# Patient Record
Sex: FEMALE | Race: WHITE | Employment: OTHER | URBAN - METROPOLITAN AREA
[De-identification: names, ages, dates, MRNs, and addresses within clinical notes are randomized per-mention and may not be internally consistent; named-entity substitution may affect disease eponyms.]

---

## 2019-01-01 NOTE — LETTER
WHERE IS YOUR PAIN NOW?  Sharon the areas on your body where you feel the described sensations.  Use the appropriate symbol.  Sharon the areas of radiation.  Include all affected areas.  Just to complete the picture, please draw in the face.     ACHE:  ^ ^ ^   NUMBNESS:  0000   PINS & NEEDLES:  = = = =                              ^ ^ ^                       0000              = = = =                                    ^ ^ ^                       0000            = = = =      BURNING:  XXXX   STABBING: ////                  XXXX                ////                         XXXX          ////     Please sharon the line below indicating your degree of pain right now  with 0 being no pain 10 being the worst pain possible.                                         0             1             2              3             4              5              6              7             8             9             10         Patient Signature:            
No significant past surgical history

## 2021-12-21 ENCOUNTER — OFFICE VISIT (OUTPATIENT)
Dept: OTOLARYNGOLOGY | Facility: CLINIC | Age: 71
End: 2021-12-21
Payer: MEDICARE

## 2021-12-21 VITALS — HEIGHT: 65.5 IN | WEIGHT: 260 LBS | BODY MASS INDEX: 42.8 KG/M2

## 2021-12-21 DIAGNOSIS — H61.23 CERUMEN DEBRIS ON TYMPANIC MEMBRANE OF BOTH EARS: Primary | ICD-10-CM

## 2021-12-21 PROCEDURE — 69210 REMOVE IMPACTED EAR WAX UNI: CPT | Performed by: SPECIALIST

## 2021-12-21 RX ORDER — ASPIRIN 81 MG/1
81 TABLET, CHEWABLE ORAL
COMMUNITY

## 2021-12-21 RX ORDER — LEVOTHYROXINE SODIUM 0.07 MG/1
75 TABLET ORAL DAILY
COMMUNITY
Start: 2021-07-25

## 2021-12-21 RX ORDER — URSODIOL 500 MG/1
TABLET, FILM COATED ORAL
COMMUNITY
Start: 2021-12-14

## 2021-12-21 NOTE — PROGRESS NOTES
Ears = bilateral cerumen occlussions. Fully cleaned under microscope using instrumentation and suctioning. Normal tympanic membranes. Lip in 6 months time, sooner if problems.

## 2023-06-06 ENCOUNTER — OFFICE VISIT (OUTPATIENT)
Dept: OTOLARYNGOLOGY | Facility: CLINIC | Age: 73
End: 2023-06-06

## 2023-06-06 VITALS — BODY MASS INDEX: 43 KG/M2 | HEIGHT: 65.5 IN

## 2023-06-06 DIAGNOSIS — H61.23 CERUMEN DEBRIS ON TYMPANIC MEMBRANE OF BOTH EARS: Primary | ICD-10-CM

## 2023-06-06 PROCEDURE — 69210 REMOVE IMPACTED EAR WAX UNI: CPT | Performed by: SPECIALIST

## 2023-06-06 RX ORDER — NYSTATIN 100000 U/G
CREAM TOPICAL
COMMUNITY
Start: 2022-11-10

## 2023-06-21 ENCOUNTER — ORDER TRANSCRIPTION (OUTPATIENT)
Dept: PHYSICAL THERAPY | Facility: HOSPITAL | Age: 73
End: 2023-06-21

## 2023-06-21 DIAGNOSIS — M25.552 BILATERAL HIP PAIN: Primary | ICD-10-CM

## 2023-06-21 DIAGNOSIS — M25.551 BILATERAL HIP PAIN: Primary | ICD-10-CM

## 2023-06-21 DIAGNOSIS — M50.30 DDD (DEGENERATIVE DISC DISEASE), CERVICAL: ICD-10-CM

## 2023-07-19 ENCOUNTER — OFFICE VISIT (OUTPATIENT)
Dept: PHYSICAL THERAPY | Age: 73
End: 2023-07-19
Attending: FAMILY MEDICINE
Payer: MEDICARE

## 2023-07-19 DIAGNOSIS — M25.551 BILATERAL HIP PAIN: Primary | ICD-10-CM

## 2023-07-19 DIAGNOSIS — M50.30 DDD (DEGENERATIVE DISC DISEASE), CERVICAL: ICD-10-CM

## 2023-07-19 DIAGNOSIS — M25.552 BILATERAL HIP PAIN: Primary | ICD-10-CM

## 2023-07-19 PROCEDURE — 97110 THERAPEUTIC EXERCISES: CPT

## 2023-07-19 PROCEDURE — 97161 PT EVAL LOW COMPLEX 20 MIN: CPT

## 2023-07-19 PROCEDURE — 97140 MANUAL THERAPY 1/> REGIONS: CPT

## 2023-07-21 ENCOUNTER — OFFICE VISIT (OUTPATIENT)
Dept: PHYSICAL THERAPY | Age: 73
End: 2023-07-21
Attending: FAMILY MEDICINE
Payer: MEDICARE

## 2023-07-21 PROCEDURE — 97110 THERAPEUTIC EXERCISES: CPT

## 2023-07-21 PROCEDURE — 97140 MANUAL THERAPY 1/> REGIONS: CPT

## 2023-07-21 NOTE — PROGRESS NOTES
Diagnosis:   Bilateral hip pain (M25.551,M25.552) lumbar DDD    Next MD visit: none scheduled  Fall Risk: standard         Precautions: n/a          Medication Changes since last visit?: No    Subjective: Patient reports her left hip and both knees are really hurting today. She states she noticed more swelling in her left leg overall. Objective:     Date: 7/21/2023  Visit #: 2/10 (medicare)   HEP   -   Therapeutic Exercise   - standing B gastroc stretch on slant board level 3 3 x 30 sec holds  - hooklying hip adduction with ball squeeze 2 x 10  - supine R/L SAQs 2.5# at ankles 1 x 10 ea 3 sec holds  - supine R/L hip IR/ER AROM 1 x 10 ea  - supine gluteal squeezes 1 x 10 5 sec holds  - hooklying R/L hip abd/ER with GTB 2 x 5 ea 5 sec holds   - standing B heel raises 10x  X 27 minutes   Manual Therapy   - foam roll to L TFL, L quad, L gluteal x 8 minutes  - left lateral hip mobilization grade 3 x 8 minutes   Therapeutic Activity   -   Modalities              Assessment: Session focused on manual techniques to assist with reducing hip pain, gentle hip strengthening.      Plan: continue PT    Charges: Ex 2 Man 1      Total Timed Treatment: 43 min  Total Treatment Time: 43 min

## 2023-07-26 ENCOUNTER — OFFICE VISIT (OUTPATIENT)
Dept: PHYSICAL THERAPY | Age: 73
End: 2023-07-26
Attending: FAMILY MEDICINE
Payer: MEDICARE

## 2023-07-26 PROCEDURE — 97110 THERAPEUTIC EXERCISES: CPT

## 2023-07-26 NOTE — PROGRESS NOTES
Diagnosis:   Bilateral hip pain (M25.551,M25.552) lumbar DDD    Next MD visit: none scheduled  Fall Risk: standard         Precautions: n/a          Medication Changes since last visit?: No    Subjective: Patient reports her hips are doing a little better. She reports only mild pain rated as 2-3/10 and continued left leg edema near her knee. Patient reports her left leg feels weak. Objective:     Date: 7/26/2023  Visit #: 3/10 (medicare) Date: 7/21/2023  Visit #: 2/10 (medicare)   HEP    -   Therapeutic Exercise   - NuStep level 4 (UEs & LEs) x 8 minutes  - standing lumbar extension over mat table 10x  - standing R/L hip abduction & hip extension with 1.5# 1 x 10 ea  - standing B gastroc stretch on slant board level 3-4 3 x 30 sec holds  - hooklying hip adduction with ball squeeze 2 x 10  - supine R/L SAQs 3# at ankles 2 x 10 ea 5 sec holds  - supine R/L hip IR/ER AROM 1 x 10 ea  - supine gluteal squeezes 1 x 10 5 sec holds  - hooklying R/L hip abd/ER with GTB 2 x 10 ea 5 sec holds   - seated R/L LAQs 1.5# 10x ea  - standing B heel raises 1.5# 10x  - L hip flexor stretch on stair 3 x 15 sec holds - standing B gastroc stretch on slant board level 3 3 x 30 sec holds  - hooklying hip adduction with ball squeeze 2 x 10  - supine R/L SAQs 2.5# at ankles 1 x 10 ea 3 sec holds  - supine R/L hip IR/ER AROM 1 x 10 ea  - supine gluteal squeezes 1 x 10 5 sec holds  - hooklying R/L hip abd/ER with GTB 2 x 5 ea 5 sec holds   - standing B heel raises 10x  X 27 minutes   Manual Therapy    - foam roll to L TFL, L quad, L gluteal x 8 minutes  - left lateral hip mobilization grade 3 x 8 minutes   Therapeutic Activity    -   Modalities                Assessment:  Session focused on global hip strengthening interventions as tolerated.      Plan: continue PT    Charges: Ex 3  Total Timed Treatment: 43 min  Total Treatment Time: 43 min

## 2023-07-28 ENCOUNTER — OFFICE VISIT (OUTPATIENT)
Dept: PHYSICAL THERAPY | Age: 73
End: 2023-07-28
Attending: FAMILY MEDICINE
Payer: MEDICARE

## 2023-07-28 PROCEDURE — 97110 THERAPEUTIC EXERCISES: CPT

## 2023-07-28 NOTE — PROGRESS NOTES
Diagnosis:   Bilateral hip pain (M25.551,M25.552) lumbar DDD    Next MD visit: none scheduled  Fall Risk: standard         Precautions: n/a          Medication Changes since last visit?: No    Subjective: Patient reports her hips are doing better. She states she has less difficulty getting out of bed and moving her legs in the morning.     Objective:     Date: 7/28/2023  Visit #: 4/10 (medicare) Date: 7/26/2023  Visit #: 3/10 (medicare) Date: 7/21/2023  Visit #: 2/10 (medicare)   HEP     -   Therapeutic Exercise   - NuStep level 4 (UEs & LEs) x 10 minutes  - standing R/L hip abduction & hip extension with 1.5# 2 x 10 ea  - standing B gastroc stretch on slant board level 3-4 3 x 30 sec holds  - hooklying hip adduction with ball squeeze 2 x 10  - supine R/L SAQs 4# at ankles 2 x 10 ea 5 sec holds  - prone R/L hip extension 10x ea  - prone B knee flexion with ball squeeze 2 x 10  - hooklying R/L hip abd/ER with GTB 2 x 10 ea 5 sec holds   - seated R/L LAQs 1.5# 2 x 10 ea  - standing B heel raises 1.5# 2 x 10   - NuStep level 4 (UEs & LEs) x 8 minutes  - standing lumbar extension over mat table 10x  - standing R/L hip abduction & hip extension with 1.5# 1 x 10 ea  - standing B gastroc stretch on slant board level 3-4 3 x 30 sec holds  - hooklying hip adduction with ball squeeze 2 x 10  - supine R/L SAQs 3# at ankles 2 x 10 ea 5 sec holds  - supine R/L hip IR/ER AROM 1 x 10 ea  - supine gluteal squeezes 1 x 10 5 sec holds  - hooklying R/L hip abd/ER with GTB 2 x 10 ea 5 sec holds   - seated R/L LAQs 1.5# 10x ea  - standing B heel raises 1.5# 10x  - L hip flexor stretch on stair 3 x 15 sec holds - standing B gastroc stretch on slant board level 3 3 x 30 sec holds  - hooklying hip adduction with ball squeeze 2 x 10  - supine R/L SAQs 2.5# at ankles 1 x 10 ea 3 sec holds  - supine R/L hip IR/ER AROM 1 x 10 ea  - supine gluteal squeezes 1 x 10 5 sec holds  - hooklying R/L hip abd/ER with GTB 2 x 5 ea 5 sec holds   - standing B heel raises 10x  X 27 minutes   Manual Therapy     - foam roll to L TFL, L quad, L gluteal x 8 minutes  - left lateral hip mobilization grade 3 x 8 minutes   Therapeutic Activity     -   Modalities                  Assessment:  Initiated prone hip strengthening interventions.     Plan: continue PT    Charges: Ex 3  Total Timed Treatment: 47 min  Total Treatment Time: 47 min

## 2023-08-02 ENCOUNTER — OFFICE VISIT (OUTPATIENT)
Dept: PHYSICAL THERAPY | Age: 73
End: 2023-08-02
Attending: FAMILY MEDICINE
Payer: MEDICARE

## 2023-08-02 PROCEDURE — 97110 THERAPEUTIC EXERCISES: CPT

## 2023-08-02 NOTE — PROGRESS NOTES
Diagnosis:   Bilateral hip pain (M25.551,M25.552) lumbar DDD    Next MD visit: none scheduled  Fall Risk: standard         Precautions: n/a          Medication Changes since last visit?: No    Subjective: Patient reports she is having more pain today. She reports she did do a lot of sitting yesterday. She reports she feels a lot better in the mornings upon waking. Pt reports continued L PSIS region pain, L groin pain, L thigh pain. She reports intermittent numbness/tingling/altered sensation in her BLEs. Pt reports increased pain symptoms shooting below her knee with bending activities.      Objective:     Date: 8/2/2023  Visit #: 5/10 (medicare) Date: 7/28/2023  Visit #: 4/10 (medicare) Date: 7/26/2023  Visit #: 3/10 (medicare)   HEP        Therapeutic Exercise   - standing R/L hip abduction & hip extension with 2# 1 x 10 ea  - standing B gastroc stretch on slant board level 3-4 3 x 30 sec holds  - hooklying hip adduction with ball squeeze 3 x 10  - prone B knee flexion with ball squeeze 2 x 10  - hooklying B hip abd/ER with fitness [place] 2 x 10   - seated R/L LAQs 3# 2 x 10 ea  - standing B heel raises 1.5# 2 x 10  - SHEA 2 minutes  - prone B knee bends with ball 10x  - standing lumbar extension 2 x 10 (no effect?)  - PPU 2 x 5 reps - NuStep level 4 (UEs & LEs) x 10 minutes  - standing R/L hip abduction & hip extension with 1.5# 2 x 10 ea  - standing B gastroc stretch on slant board level 3-4 3 x 30 sec holds  - hooklying hip adduction with ball squeeze 2 x 10  - supine R/L SAQs 4# at ankles 2 x 10 ea 5 sec holds  - prone R/L hip extension 10x ea  - prone B knee flexion with ball squeeze 2 x 10  - hooklying R/L hip abd/ER with GTB 2 x 10 ea 5 sec holds   - seated R/L LAQs 1.5# 2 x 10 ea  - standing B heel raises 1.5# 2 x 10   - NuStep level 4 (UEs & LEs) x 8 minutes  - standing lumbar extension over mat table 10x  - standing R/L hip abduction & hip extension with 1.5# 1 x 10 ea  - standing B gastroc stretch on slant board level 3-4 3 x 30 sec holds  - hooklying hip adduction with ball squeeze 2 x 10  - supine R/L SAQs 3# at ankles 2 x 10 ea 5 sec holds  - supine R/L hip IR/ER AROM 1 x 10 ea  - supine gluteal squeezes 1 x 10 5 sec holds  - hooklying R/L hip abd/ER with GTB 2 x 10 ea 5 sec holds   - seated R/L LAQs 1.5# 10x ea  - standing B heel raises 1.5# 10x  - L hip flexor stretch on stair 3 x 15 sec holds   Manual Therapy        Therapeutic Activity   - education on anatomy of condition x 5 minutes  - discussed sitting in upright posture - avoidance of slouching     Modalities                  Assessment:  Trial of lumbar extension based exercises and pt displaying slight improvement in functional mobility post interventions including transfers and walking with possible centralization of symptoms, but unclear at this time.      Plan: continue PT    Charges: Ex 3  Total Timed Treatment: 44 min  Total Treatment Time: 44 min

## 2023-08-09 ENCOUNTER — OFFICE VISIT (OUTPATIENT)
Dept: PHYSICAL THERAPY | Age: 73
End: 2023-08-09
Attending: FAMILY MEDICINE
Payer: MEDICARE

## 2023-08-09 PROCEDURE — 97110 THERAPEUTIC EXERCISES: CPT

## 2023-08-09 PROCEDURE — 97140 MANUAL THERAPY 1/> REGIONS: CPT

## 2023-08-09 NOTE — PROGRESS NOTES
Diagnosis:   Bilateral hip pain (M25.551,M25.552) lumbar DDD    Next MD visit: none scheduled  Fall Risk: standard         Precautions: n/a          Medication Changes since last visit?: No    Subjective: Patient reports 5-6/10 hip and states she had more pain yesterday and had to take aleve and as a result sat a lot. She reports her left leg is still swollen. Pt reports RLE pain going down her entire leg at times and continued L groin/thigh region pain.      Objective:    8/9/2023:  Continued observable L thigh edema noted     Date: 8/9/2023  Visit #: 6/10 (medicare) Date: 8/2/2023  Visit #: 5/10 (medicare) Date: 7/28/2023  Visit #: 4/10 (medicare)   HEP        Therapeutic Exercise   - supine R/L SAQs with 3# 2 x 10 ea 5 sec holds  - prone R/L hip extension 2 x 10 ea  - prone B knee bends with ball with squeeze 2 x 10  - standing lumbar extension 5x  - seated lumbar extension 10x  - standing B gastroc stretch on slant board level 4 3 x 30 sec holds  X 36 minutes - standing R/L hip abduction & hip extension with 2# 1 x 10 ea  - standing B gastroc stretch on slant board level 3-4 3 x 30 sec holds  - hooklying hip adduction with ball squeeze 3 x 10  - prone B knee flexion with ball squeeze 2 x 10  - hooklying B hip abd/ER with fitness [place] 2 x 10   - seated R/L LAQs 3# 2 x 10 ea  - standing B heel raises 1.5# 2 x 10  - SHEA 2 minutes  - prone B knee bends with ball 10x  - standing lumbar extension 2 x 10 (no effect?)  - PPU 2 x 5 reps - NuStep level 4 (UEs & LEs) x 10 minutes  - standing R/L hip abduction & hip extension with 1.5# 2 x 10 ea  - standing B gastroc stretch on slant board level 3-4 3 x 30 sec holds  - hooklying hip adduction with ball squeeze 2 x 10  - supine R/L SAQs 4# at ankles 2 x 10 ea 5 sec holds  - prone R/L hip extension 10x ea  - prone B knee flexion with ball squeeze 2 x 10  - hooklying R/L hip abd/ER with GTB 2 x 10 ea 5 sec holds   - seated R/L LAQs 1.5# 2 x 10 ea  - standing B heel raises 1.5# 2 x 10     Manual Therapy   - prone lumbar spine mobilizations grade 2-3 x 6 minutes  - foam roll to R/L piriformis, gluteal max, TFL x 6 minutes     Therapeutic Activity    - education on anatomy of condition x 5 minutes  - discussed sitting in upright posture - avoidance of slouching    Modalities                  Assessment:  Discussed with patient that since she has completed six PT sessions and is still continuing to have severe left hip pain limiting her walking it would be recommended for her to follow up with her MD in the near future for further recommendations. Session focused on continuation of hip and spine interventions.      Plan: continue PT -2-3 more sessions - pt to contact MD as well    Charges: Ex 2 Man 1  Total Timed Treatment: 48 min  Total Treatment Time: 52 min

## 2023-08-16 ENCOUNTER — OFFICE VISIT (OUTPATIENT)
Dept: PHYSICAL THERAPY | Age: 73
End: 2023-08-16
Attending: INTERNAL MEDICINE
Payer: MEDICARE

## 2023-08-16 PROCEDURE — 97110 THERAPEUTIC EXERCISES: CPT

## 2023-08-16 PROCEDURE — 97140 MANUAL THERAPY 1/> REGIONS: CPT

## 2023-08-16 NOTE — PROGRESS NOTES
Diagnosis:   Bilateral hip pain (M25.551,M25.552) lumbar DDD    Next MD visit: none scheduled  Fall Risk: standard         Precautions: n/a          Medication Changes since last visit?: No    Subjective: Patient reports 1/10 left hip/groin pain today. She states she still has bad days with her hips and back, but states today it is better. She reports when she sits she is okay, but when she ambulates and bears weight on her legs they will start hurting.       Objective:    8/9/2023:  Continued observable L thigh edema noted     Date: 8/16/2023  Visit #: 7/10 (medicare) Date: 8/9/2023  Visit #: 6/10 (medicare) Date: 8/2/2023  Visit #: 5/10 (medicare)   HEP        Therapeutic Exercise   - bridges with ball 2 x 10  - supine R/L SAQs with 4# 2 x 10 ea 5 sec holds  - hooklying R/L hip abd/ER with GTB 2 x 10 ea   - prone R/L hip extension 2 x 10 ea  - shuttle machine B knee ext/flx 4 bands 3 x 10  - standing B heel raises 2 x 10  - standing B gastroc stretch on slant board level 4 3 x 30 sec holds  - standing R/L hip abduction/hip extension with 2# 2 x 10 ea  - standing R/L hip flexor stretch on stair 3 x 15 sec holds  X 38 minutes     - supine R/L SAQs with 3# 2 x 10 ea 5 sec holds  - prone R/L hip extension 2 x 10 ea  - prone B knee bends with ball with squeeze 2 x 10  - standing lumbar extension 5x  - seated lumbar extension 10x  - standing B gastroc stretch on slant board level 4 3 x 30 sec holds  X 36 minutes - standing R/L hip abduction & hip extension with 2# 1 x 10 ea  - standing B gastroc stretch on slant board level 3-4 3 x 30 sec holds  - hooklying hip adduction with ball squeeze 3 x 10  - prone B knee flexion with ball squeeze 2 x 10  - hooklying B hip abd/ER with fitness [place] 2 x 10   - seated R/L LAQs 3# 2 x 10 ea  - standing B heel raises 1.5# 2 x 10  - SHEA 2 minutes  - prone B knee bends with ball 10x  - standing lumbar extension 2 x 10 (no effect?)  - PPU 2 x 5 reps   Manual Therapy   - foam roll to R/L piriformis, gluteal max, TFL x 12 minutes - prone lumbar spine mobilizations grade 2-3 x 6 minutes  - foam roll to R/L piriformis, gluteal max, TFL x 6 minutes    Therapeutic Activity     - education on anatomy of condition x 5 minutes  - discussed sitting in upright posture - avoidance of slouching   Modalities                  Assessment:  Continued with manual techniques to reduce gluteal/TFL/ITB tension and strengthening of hip musculature as tolerated. Initiated hip flexor stretching on stair as well.      Plan: continue PT 1-2 more sessions - pt to contact MD as well    Charges: Ex 2 Man 1  Total Timed Treatment: 50 min  Total Treatment Time: 50 min

## 2023-08-24 ENCOUNTER — OFFICE VISIT (OUTPATIENT)
Dept: PHYSICAL THERAPY | Age: 73
End: 2023-08-24
Attending: INTERNAL MEDICINE
Payer: MEDICARE

## 2023-08-24 PROCEDURE — 97110 THERAPEUTIC EXERCISES: CPT

## 2023-08-24 PROCEDURE — 97140 MANUAL THERAPY 1/> REGIONS: CPT

## 2023-08-24 NOTE — PROGRESS NOTES
Diagnosis:   Bilateral hip pain (M25.551,M25.552) lumbar DDD    Next MD visit: none scheduled  Fall Risk: standard         Precautions: n/a          Medication Changes since last visit?: No    Subjective: Patient reports she is having a lot of difficulty getting her shoes and socks on. Pt reports in the mornings she feels okay, but once she starts walking and bearing weight her left leg really hurts. She reports she was crying yesterday due to the pain. She reports she has a doctor visit coming up next week.      Objective:    8/9/2023:  Continued observable L thigh edema noted     Date: 8/24/2023  Visit #: 8/10 (medicare) Date: 8/16/2023  Visit #: 7/10 (medicare) Date: 8/9/2023  Visit #: 6/10 (medicare)   HEP   - updated HEP - see pt instructions section     Therapeutic Exercise   - bridges with SB 2 x 10  - supine R/L SAQs with 4# 3 x 10 ea 5 sec holds  - sidelying R/L clams with 4# 10x ea  - shuttle machine B knee ext/flx 4 bands 3 x 10  - standing B heel raises 2 x 10  - standing B gastroc stretch on slant board level 4 3 x 30 sec holds  - standing R/L hip abduction/hip extension with RTB at ankles 2 x 10 ea  X 35 minutes - bridges with ball 2 x 10  - supine R/L SAQs with 4# 2 x 10 ea 5 sec holds  - hooklying R/L hip abd/ER with GTB 2 x 10 ea   - prone R/L hip extension 2 x 10 ea  - shuttle machine B knee ext/flx 4 bands 3 x 10  - standing B heel raises 2 x 10  - standing B gastroc stretch on slant board level 4 3 x 30 sec holds  - standing R/L hip abduction/hip extension with 2# 2 x 10 ea  - standing R/L hip flexor stretch on stair 3 x 15 sec holds  X 38 minutes     - supine R/L SAQs with 3# 2 x 10 ea 5 sec holds  - prone R/L hip extension 2 x 10 ea  - prone B knee bends with ball with squeeze 2 x 10  - standing lumbar extension 5x  - seated lumbar extension 10x  - standing B gastroc stretch on slant board level 4 3 x 30 sec holds  X 36 minutes   Manual Therapy   - foam roll to R/L piriformis, gluteal max, TFL x 12 minutes - foam roll to R/L piriformis, gluteal max, TFL x 12 minutes - prone lumbar spine mobilizations grade 2-3 x 6 minutes  - foam roll to R/L piriformis, gluteal max, TFL x 6 minutes   Therapeutic Activity        Modalities                  Assessment:  Progressed hip strengthening interventions as tolerated. Advanced reps of current therapeutic exercises.        Plan: continue PT 1 more session - pt to follow up with MD    Charges: Ex 2 Man 1  Total Timed Treatment: 47 min  Total Treatment Time: 47 min

## 2023-08-31 ENCOUNTER — OFFICE VISIT (OUTPATIENT)
Dept: PHYSICAL THERAPY | Age: 73
End: 2023-08-31
Attending: INTERNAL MEDICINE
Payer: MEDICARE

## 2023-08-31 PROCEDURE — 97110 THERAPEUTIC EXERCISES: CPT

## 2023-10-23 ENCOUNTER — TELEPHONE (OUTPATIENT)
Dept: PHYSICAL MEDICINE AND REHAB | Facility: CLINIC | Age: 73
End: 2023-10-23

## 2023-10-23 ENCOUNTER — OFFICE VISIT (OUTPATIENT)
Dept: PHYSICAL MEDICINE AND REHAB | Facility: CLINIC | Age: 73
End: 2023-10-23
Payer: MEDICARE

## 2023-10-23 VITALS — OXYGEN SATURATION: 99 % | HEART RATE: 76 BPM | BODY MASS INDEX: 36 KG/M2 | WEIGHT: 220 LBS

## 2023-10-23 DIAGNOSIS — M16.12 PRIMARY OSTEOARTHRITIS OF LEFT HIP: Primary | ICD-10-CM

## 2023-10-23 PROBLEM — E03.9 HYPOTHYROIDISM: Status: ACTIVE | Noted: 2023-10-23

## 2023-10-23 PROBLEM — E78.5 HYPERLIPIDEMIA: Status: ACTIVE | Noted: 2023-10-23

## 2023-10-23 PROBLEM — K74.3 PRIMARY BILIARY CHOLANGITIS (HCC): Status: ACTIVE | Noted: 2021-02-19

## 2023-10-23 PROCEDURE — 99204 OFFICE O/P NEW MOD 45 MIN: CPT | Performed by: PHYSICAL MEDICINE & REHABILITATION

## 2023-10-23 RX ORDER — PRAVASTATIN SODIUM 20 MG
20 TABLET ORAL DAILY
COMMUNITY
Start: 2023-09-05

## 2023-10-23 NOTE — TELEPHONE ENCOUNTER
XR L hip and lumbar spine DOS 06/14/23 scanned into PACS and copy of report placed on Dr. Martina Brown desk. Disc and report mailed to patient.

## 2023-10-23 NOTE — PATIENT INSTRUCTIONS
Please consider aquatic exercises for hip arthritis. Very beneficial.  [address] has pool exercise classes for arthritis at [facility]. Please bring the CD with hip x-ray images from [facility] for me to review.

## 2023-10-23 NOTE — TELEPHONE ENCOUNTER
Per CMS Guidelines -no authorization is required for Left hip injection CPT 15978, 01184     Status: Authorization is not required based on medical necessity however may be subject to review once claim is submitted-Covered Benefit

## 2023-10-26 NOTE — TELEPHONE ENCOUNTER
Patient has been scheduled for  Left hip injection   on 11/9/2023 at the [facility] with .   -Anesthesia type: local.  -If scheduling [address] covid testing required for all procedures whether patient is vaccinated or not. -Patient informed not to eat or drink anything after midnight the night prior to the procedure, if being sedated. (For afternoon injections: Patient to fast 6-8 hours prior to procedure with IVCS/MAC. )  -Patient was advised that if he/she does receive the covid vaccine it needs to be at least 2 weeks before or after the injection. -Medications and allergies reviewed. -Patient reminded to hold NSAIDs (Ibuprofen, ASA 81, Aleve, Naproxen, Mobic, Diclofenac, Etodolac, Celebrex etc.) for 3 days prior to [facility]  if BMI is greater than 35. For Cervical injections only hold multivitamins, Vitamin E, Fish Oil, Phentermine (Lomaira) for 7 days prior to injection and NSAIDS.  mg to be held for 7 days prior to injections.  -If patient is receiving MAC/IVCS Phentermine Gertrude Fester), Adlyxin (Lixisenatide), Bydureon BCise (Exenatide-release), Byetta (Exenatide), Mounjaro (Tirezepatide), Ozempic (Semaglutide), Rybelsus (oral demaglutide), Saxenda (Liraglutide), Trulicity (Dulaglutide), Victoriza (Liraglutide), Wegovy (Semaglutide), Berberine (oral natures ozempic) will need to be held for 7 days prior to injection.  -If patient's BMI is greater than 50. Patient is unable to get IVCS/MAC at [facility]. (Options: Patient can go to [facility] under MAC or ENDO under IVCS-reminder physician's slots at ENDO are limited. OR Patient can have the procedure done under local anesthesia at [facility] with Valium ( per physician's preference.)    -If on blood thinner clearance has been received to hold this medication by provider.   -Patient informed he/she will need a  to and from procedure. -[facility] is located in the [facility] 1st floor.  Patient may park in the yellow or purple parking.     Patient verbalized understanding and agrees with plan.  -----> Scheduled in Epic: Yes  -----> Scheduled in [employer]/Surgical Case Request: Yes

## 2023-11-09 PROBLEM — M16.12 PRIMARY OSTEOARTHRITIS OF LEFT HIP: Status: ACTIVE | Noted: 2023-11-09

## 2023-12-11 ENCOUNTER — OFFICE VISIT (OUTPATIENT)
Dept: PHYSICAL MEDICINE AND REHAB | Facility: CLINIC | Age: 73
End: 2023-12-11
Payer: MEDICARE

## 2023-12-11 VITALS — HEIGHT: 66 IN | WEIGHT: 208 LBS | BODY MASS INDEX: 33.43 KG/M2

## 2023-12-11 DIAGNOSIS — M16.12 PRIMARY OSTEOARTHRITIS OF LEFT HIP: Primary | ICD-10-CM

## 2023-12-11 PROCEDURE — 99214 OFFICE O/P EST MOD 30 MIN: CPT | Performed by: PHYSICAL MEDICINE & REHABILITATION

## 2023-12-11 RX ORDER — MELOXICAM 15 MG/1
15 TABLET ORAL DAILY
Qty: 30 TABLET | Refills: 0 | Status: SHIPPED | OUTPATIENT
Start: 2023-12-11

## 2023-12-12 ENCOUNTER — TELEPHONE (OUTPATIENT)
Dept: PHYSICAL MEDICINE AND REHAB | Facility: CLINIC | Age: 73
End: 2023-12-12

## 2023-12-12 DIAGNOSIS — M16.12 PRIMARY OSTEOARTHRITIS OF LEFT HIP: Primary | ICD-10-CM

## 2023-12-13 NOTE — TELEPHONE ENCOUNTER
Patient has been scheduled for Left hip injection on 12/21/2023 at the [facility] with .     -Anesthesia type: LOCAL. -If scheduling [address] [facility] covid testing required for all procedures whether patient is vaccinated or not. -Patient informed not to eat or drink anything after midnight the night prior to the procedure, if being sedated. (For afternoon injections: Patient to fast 6-8 hours prior to procedure with IVCS/MAC. )  -Patient was advised that if he/she does receive the covid vaccine it needs to be at least 2 weeks before or after the injection. -Medications and allergies reviewed. -Patient reminded to hold NSAIDs (Ibuprofen, ASA 81, Aleve, Naproxen, Mobic, Diclofenac, Etodolac, Celebrex etc.) for 3 days prior to [place]  if BMI is greater than 35. For Cervical injections only hold multivitamins, Vitamin E, Fish Oil, Phentermine (Lomaira) for 7 days prior to injection and NSAIDS.  mg to be held for 7 days prior to injections.  -If patient is receiving MAC/IVCS Phentermine Gertrude Fester), Adlyxin (Lixisenatide), Bydureon BCise (Exenatide-release), Byetta (Exenatide), Mounjaro (Tirezepatide), Ozempic (Semaglutide), Rybelsus (oral demaglutide), Saxenda (Liraglutide), Trulicity (Dulaglutide), Victoriza (Liraglutide), Wegovy (Semaglutide), Berberine (oral natures ozempic) will need to be held for 7 days prior to injection.  -If patient's BMI is greater than 50. Patient is unable to get IVCS/MAC at [facility]. (Options: Patient can go to [address] [place] [address] under MAC or ENDO under IVCS-reminder physician's slots at ENDO are limited. OR Patient can have the procedure done under local anesthesia at [facility] with Valium ( per physician's preference.)    -If on blood thinner clearance has been received to hold this medication by provider.   -Patient informed he/she will need a  to and from procedure.  EFFECTIVE 12/1/23- Per [facility]: \" Our PAT team will notify the patient that their ride MUST remain onsite for the entirety of their visit if the ride is unable to do so the procedure will be canceled. \"    -[address] [place] [address] is located in the [facility] 1st floor. Patient may park in the yellow or purple parking.     Follow up appointment has been scheduled for patient on:     Patient verbalized understanding and agrees with plan.  -----> Scheduled in Epic: Yes  -----> Scheduled in [employer]/Surgical Case Request: Yes

## 2024-01-29 ENCOUNTER — TELEPHONE (OUTPATIENT)
Dept: NEUROLOGY | Facility: CLINIC | Age: 74
End: 2024-01-29

## 2024-01-29 RX ORDER — MELOXICAM 15 MG/1
15 TABLET ORAL DAILY
Qty: 30 TABLET | Refills: 0 | Status: SHIPPED | OUTPATIENT
Start: 2024-01-29

## 2024-01-29 NOTE — TELEPHONE ENCOUNTER
Refill Request    Medication request: Meloxicam 15 MG Oral Tab. Take 1 tablet (15 mg total) by mouth daily.     LOV: 12/11/2023 with Akshat Felton M.D.  Due back to clinic per last office note: Per Dr. Felton: \"I recommend a repeat hip injection, Mobic and to see Dr. Page.\"  NOV: Visit date not found f/u post-ortho (?); no specific f/u date mentioned in LOV note.     [place]: 12[place]11/2023 #30    Urine drug screen (if applicable): n/a  Pain contract: n/a    LOV plan (if weaning or changing medications): see above      This is refill 2 out of 3. Next refill, if requested would be the last authorized before a follow up appointment is required.

## 2024-02-28 RX ORDER — MELOXICAM 15 MG/1
15 TABLET ORAL DAILY
Qty: 30 TABLET | Refills: 0 | Status: SHIPPED | OUTPATIENT
Start: 2024-02-28

## 2024-02-28 NOTE — TELEPHONE ENCOUNTER
Refill Request    Medication request: Meloxicam 15 MG Oral Tab.  Take 1 tablet (15 mg total) by mouth daily.      LOV:Visit date not found   Due back to clinic per last office note:  I recommend a repeat hip injection, Mobic and to see Dr. Page.   NOV: Visit date not found      [place]: 1/29/2024 #30    Urine drug screen (if applicable): N/A  Pain contract: N/A    LOV plan (if weaning or changing medications): I recommend a repeat hip injection, Mobic and to see Dr. Page.     Per refill request on 1/29/2024 This is refill 2 out of 3. Next refill, if requested would be the last authorized before a follow up appointment is required.       This is last refill authorized.

## 2024-03-19 ENCOUNTER — OFFICE VISIT (OUTPATIENT)
Dept: OTOLARYNGOLOGY | Facility: CLINIC | Age: 74
End: 2024-03-19

## 2024-03-19 VITALS — HEIGHT: 66 IN | WEIGHT: 208 LBS | BODY MASS INDEX: 33.43 KG/M2

## 2024-03-19 DIAGNOSIS — H93.13 BILATERAL TINNITUS: ICD-10-CM

## 2024-03-19 DIAGNOSIS — H61.23 CERUMEN DEBRIS ON TYMPANIC MEMBRANE OF BOTH EARS: Primary | ICD-10-CM

## 2024-03-19 PROCEDURE — 99213 OFFICE O/P EST LOW 20 MIN: CPT | Performed by: SPECIALIST

## 2024-03-19 NOTE — PROGRESS NOTES
Brianna Bragg is a 73 year old female.   Chief Complaint   Patient presents with    Ear Wax     Ear cleaning     HPI:   Here to get her ears cleaned and reports intermittent tinnitus.    Current Outpatient Medications   Medication Sig Dispense Refill    MELOXICAM 15 MG Oral Tab Take 1 tablet by mouth once daily 30 tablet 0    Calcium Carbonate Antacid 600 MG Oral Chew Tab Chew 600 mg by mouth.      Cholecalciferol 125 MCG (5000 UT) Oral Tab Take 1 tablet (5,000 Units total) by mouth daily.      pravastatin 20 MG Oral Tab Take 1 tablet (20 mg total) by mouth daily.      nystatin 100,000 Units/g External Cream Apply bid x 2 weeks      levothyroxine 75 MCG Oral Tab Take 1 tablet (75 mcg total) by mouth daily.      Ursodiol 500 MG Oral Tab TAKE 2 TABLETS BY MOUTH ONCE DAILY IN THE MORNING AND 1 IN THE EVENING      aspirin 81 MG Oral Chew Tab Chew 1 tablet (81 mg total) by mouth.        Past Medical History:   Diagnosis Date    Disorder of thyroid     High cholesterol     Hyperlipidemia     Hyperthyroidism       Social History:  Social History     Socioeconomic History    Marital status:    Tobacco Use    Smoking status: Never    Smokeless tobacco: Never   Vaping Use    Vaping Use: Never used   Substance and Sexual Activity    Alcohol use: Never    Drug use: Never        REVIEW OF SYSTEMS:   GENERAL HEALTH: feels well otherwise  GENERAL : denies fever, chills, sweats, weight loss, weight gain  SKIN: denies any unusual skin lesions or rashes  RESPIRATORY: denies shortness of breath with exertion  NEURO: denies headaches    EXAM:   Ht 5' 6\" (1.676 m)   Wt 208 lb (94.3 kg)   BMI 33.57 kg/m²   System Details   Skin Inspection - Normal.   Constitutional Overall appearance - Normal.   Head/Face Facial features - Normal. Eyebrows - Normal. Skull - Normal.   Eyes Conjunctiva - Right: Normal, Left: Normal. Pupil - Right: Normal, Left: Normal.    Ears Inspection - Right: Normal, Left: Normal.   Ears = bilateral cerumen  occlussions.    Fully cleaned under microscope using instrumentation and suctioning.    Normal tympanic membranes.  Tuning fork 512 Hz right equals left   Nasal External nose - Normal.   Nasal septum - Normal.  Turbinates - Normal.   Oral/Oropharynx Lips - Normal, Tonsils - Normal, Tongue - Normal    Neck Exam Inspection - Normal. Palpation - Normal. Parotid gland - Normal. Thyroid gland - Normal.  No carotid bruits by auscultation   Lymph Detail Submental. Submandibular. Anterior cervical. Posterior cervical. Supraclavicular all without enlargement   Psychiatric Orientation - Oriented to time, place, person & situation. Appropriate mood and affect.   Neurological Memory - Normal. Cranial nerves - Cranial nerves II through XII grossly intact.     ASSESSMENT AND PLAN:   1. Cerumen debris on tympanic membrane of both ears  Fully cleaned as above    2. Bilateral tinnitus  Hearing grossly symmetric by tuning fork evaluation.  No carotid bruits.  Can reduce sodium and caffeine.  Can try Lipoflavonoid.  If tinnitus is persistent would recommend an audiogram.  Follow-up in 6 months time, sooner if problems.      The patient indicates understanding of these issues and agrees to the plan.      Sylvia Escobar MD  3/19/2024  12:26 PM

## 2024-03-19 NOTE — PATIENT INSTRUCTIONS
Cerumen was fully cleaned from your ears.  You can reduce your sodium and caffeine for your tinnitus.  You can also try Lipoflavonoid.  If the tinnitus is persistent, an audiogram would be recommended.  Follow-up in 6 months time, sooner if problems.

## 2024-04-18 RX ORDER — MELOXICAM 15 MG/1
15 TABLET ORAL DAILY
Qty: 30 TABLET | Refills: 0 | OUTPATIENT
Start: 2024-04-18

## 2024-04-18 NOTE — TELEPHONE ENCOUNTER
Refill Request    Medication request: MELOXICAM 15 MG Oral Tab. Take 1 tablet by mouth once daily      LOV:12/11/2023 with Dr Felton  Due back to clinic per last office note:  I recommend a repeat hip injection, Mobic and to see Dr. Page. Injection done on 12/21/2023.  NOV: Visit date not found      [place]: 2/29/2024 #30    Urine drug screen (if applicable): N/A  Pain contract: N/A    LOV plan (if weaning or changing medications): on 2/28/2024 Last refill authorized.     Refill denied, until patient is seen in the follow up appointment

## 2024-05-01 RX ORDER — MELOXICAM 15 MG/1
15 TABLET ORAL DAILY
Qty: 30 TABLET | Refills: 0 | OUTPATIENT
Start: 2024-05-01

## 2024-05-01 NOTE — TELEPHONE ENCOUNTER
Refill Request    Medication request: MELOXICAM 15 MG Oral Tab.  Take 1 tablet by mouth once daily      LOV:12/11/2023 with Dr Felton  Due back to clinic per last office note: post injection follow up per protocol.    NOV: Visit date not found      [place]: 2/29/2024 #30 (30 days)    Urine drug screen (if applicable): N/A  Pain contract: N/A    LOV plan (if weaning or changing medications): I recommend a repeat hip injection, Mobic and to see Dr. Page.       Refill denied. Please see request on 4/18/2024 as well. Patient needs to be seen in office prior.

## 2024-05-13 ENCOUNTER — MED REC SCAN ONLY (OUTPATIENT)
Dept: PHYSICAL MEDICINE AND REHAB | Facility: CLINIC | Age: 74
End: 2024-05-13

## 2024-05-13 ENCOUNTER — OFFICE VISIT (OUTPATIENT)
Dept: PHYSICAL MEDICINE AND REHAB | Facility: CLINIC | Age: 74
End: 2024-05-13

## 2024-05-13 DIAGNOSIS — M16.12 PRIMARY OSTEOARTHRITIS OF LEFT HIP: Primary | ICD-10-CM

## 2024-05-13 PROCEDURE — 99214 OFFICE O/P EST MOD 30 MIN: CPT | Performed by: PHYSICAL MEDICINE & REHABILITATION

## 2024-05-13 NOTE — PROGRESS NOTES
[facility]  Progress Note    CHIEF COMPLAINT:    Chief Complaint   Patient presents with    Low Back Pain     LOV: 12/11/23 patient here for lbp bilat w/ radiation b/l legs but numbness to b/l legs & pain to L leg. Pt 6/10 - constant ache- more when going to bed. Pt states she is able to function, however is in a lot of pain. Out of Meloxicam, but takes Tylenol Arthritis.        History of Present Illness:  Brianna Bragg is a 73 year old female who presents today for follow up for symptoms of chronic left hip pain due to severe osteoarthritis.  I last saw her in December for a left hip injection.  She has not followed up since then.  I previously asked her to see Dr. Page but she has been reluctant to talk to a surgeon.  She is here primarily because I stopped her meloxicam after using it for 3 months.  She told the nurse that she now has bilateral low back pain with radiation down both legs.  She has grade 1 L5-S1 anterolisthesis.      PAST MEDICAL HISTORY:  Past Medical History:    Disorder of thyroid    High cholesterol    Hyperlipidemia    Hyperthyroidism       SURGICAL HISTORY:  Past Surgical History:   Procedure Laterality Date    Appendectomy         SOCIAL HISTORY:   Social History     Occupational History    Not on file   Tobacco Use    Smoking status: Never    Smokeless tobacco: Never   Vaping Use    Vaping status: Never Used   Substance and Sexual Activity    Alcohol use: Never    Drug use: Never    Sexual activity: Not on file       CURRENT MEDICATIONS:   Current Outpatient Medications   Medication Sig Dispense Refill    Calcium Carbonate Antacid 600 MG Oral Chew Tab Chew 600 mg by mouth.      Cholecalciferol 125 MCG (5000 UT) Oral Tab Take 1 tablet (5,000 Units total) by mouth daily.      pravastatin 20 MG Oral Tab Take 1 tablet (20 mg total) by mouth daily.      nystatin 100,000 Units/g External Cream Apply bid x 2 weeks      levothyroxine 75 MCG  Oral Tab Take 1 tablet (75 mcg total) by mouth daily.      Ursodiol 500 MG Oral Tab TAKE 2 TABLETS BY MOUTH ONCE DAILY IN THE MORNING AND 1 IN THE EVENING      aspirin 81 MG Oral Chew Tab Chew 1 tablet (81 mg total) by mouth.      MELOXICAM 15 MG Oral Tab Take 1 tablet by mouth once daily (Patient not taking: Reported on 2024) 30 tablet 0       ALLERGIES:   Allergies   Allergen Reactions    Penicillins NAUSEA ONLY           PHYSICAL EXAM:   There were no vitals taken for this visit.    There is no height or weight on file to calculate BMI.      General: No immediate distress  Extremities: No lower extremity edema bilaterally   Spine: full and painfree lumbar ROM in all directions  Hips: Painful and limited left hip range of motion  Neuro:   Cognition: alert & oriented x 3, attentive, able to follow 2 step commands, comprehention intact, spontaneous speech intact  Strength: Lower extremities have 5/5 strength  Sensation: Normal lower extremities  Reflexes: Normal lower extremities  SLR: neg        Data    Radiology Imagin.  I personally reviewed a plain film x-rays of the left hip from 2023 from [place] which shows severe left hip joint space narrowing with subchondral sclerosis and cysts.  2.  I personally reviewed a plain film x-ray of the lumbar spine from 2023 from [facility] shows multilevel disc space narrowing, facet arthropathy and intact vertebral bodies.      ASSESSMENT AND PLAN:  1. Primary osteoarthritis of left hip  She is status post 2 hip injections which were minimally helpful.  Physical therapy has not helped.  She cannot be on long-term Mobic.  Her examination is unchanged with obvious symptomatic left hip arthritis.  She needs a replacement.  She is fearful of this.  She continues to seek alternatives.  I directly informed her she will never feel better unless she has the hip replaced.  She has exhausted all nonoperative care options and I explained this in very plain terms.  She  will consider her options.  Otherwise I have nothing further to offer her from the physiatry perspective.    I once again gave her information to see Dr. Page.    RTC as needed      The patient was in agreement with the assessment and plan.  All questions were answered.        Akshat Felton MD  Physical Medicine and Rehabilitation/Sports Medicine  [facility]

## 2024-09-24 NOTE — TELEPHONE ENCOUNTER
Per CMS Guidelines -no authorization is required for Left hip injection CPT 74375, 08749    Status: Authorization is not required based on medical necessity however may be subject to review once claim is submitted-Covered Benefit No

## 2024-12-03 ENCOUNTER — OFFICE VISIT (OUTPATIENT)
Dept: OTOLARYNGOLOGY | Facility: CLINIC | Age: 74
End: 2024-12-03

## 2024-12-03 VITALS — BODY MASS INDEX: 35.68 KG/M2 | HEIGHT: 66 IN | WEIGHT: 222 LBS

## 2024-12-03 DIAGNOSIS — H61.23 CERUMEN DEBRIS ON TYMPANIC MEMBRANE OF BOTH EARS: Primary | ICD-10-CM

## 2024-12-03 DIAGNOSIS — H91.93 DECREASED HEARING OF BOTH EARS: ICD-10-CM

## 2024-12-03 PROCEDURE — 99213 OFFICE O/P EST LOW 20 MIN: CPT | Performed by: SPECIALIST

## 2024-12-03 NOTE — PROGRESS NOTES
Brianna Bragg is a 74 year old female.   Chief Complaint   Patient presents with    Ear Wax     Ear cleaning     HPI:   Here to get her ears cleaned and checked.  He has had tinnitus in the past.    Current Outpatient Medications   Medication Sig Dispense Refill    MELOXICAM 15 MG Oral Tab Take 1 tablet by mouth once daily 30 tablet 0    Calcium Carbonate Antacid 600 MG Oral Chew Tab Chew 600 mg by mouth.      Cholecalciferol 125 MCG (5000 UT) Oral Tab Take 1 tablet (5,000 Units total) by mouth daily.      pravastatin 20 MG Oral Tab Take 1 tablet (20 mg total) by mouth daily.      nystatin 100,000 Units/g External Cream Apply bid x 2 weeks      levothyroxine 75 MCG Oral Tab Take 1 tablet (75 mcg total) by mouth daily.      Ursodiol 500 MG Oral Tab TAKE 2 TABLETS BY MOUTH ONCE DAILY IN THE MORNING AND 1 IN THE EVENING      aspirin 81 MG Oral Chew Tab Chew 1 tablet (81 mg total) by mouth.        Past Medical History:    Disorder of thyroid    High cholesterol    Hyperlipidemia    Hyperthyroidism      Social History:  Social History     Socioeconomic History    Marital status:    Tobacco Use    Smoking status: Never    Smokeless tobacco: Never   Vaping Use    Vaping status: Never Used   Substance and Sexual Activity    Alcohol use: Never    Drug use: Never     Social Drivers of Health     Financial Resource Strain: Low Risk  (7/1/2024)    Received from [facility]    Overall Financial Resource Strain (CARDIA)     Difficulty of Paying Living Expenses: Not very hard   Food Insecurity: No Food Insecurity (7/1/2024)    Received from [facility]    Hunger Vital Sign     Worried About Running Out of Food in the Last Year: Never true     Ran Out of Food in the Last Year: Never true   Transportation Needs: No Transportation Needs (7/1/2024)    Received from [facility]    PRAPARE - Transportation     Lack of Transportation (Medical): No     Lack of  Transportation (Non-Medical): No   Housing Stability: Low Risk  (7/1/2024)    Received from [facility]    Housing Stability Vital Sign     Unable to Pay for Housing in the Last Year: No     Number of Places Lived in the Last Year: 1     Unstable Housing in the Last Year: No        REVIEW OF SYSTEMS:   GENERAL HEALTH: feels well otherwise  GENERAL : denies fever, chills, sweats, weight loss, weight gain  SKIN: denies any unusual skin lesions or rashes  RESPIRATORY: denies shortness of breath with exertion  NEURO: denies headaches    EXAM:   Ht 5' 6\" (1.676 m)   Wt 222 lb (100.7 kg)   BMI 35.83 kg/m²   System Details   Skin Inspection - Normal.   Constitutional Overall appearance - Normal.   Head/Face Facial features - Normal. Eyebrows - Normal. Skull - Normal.   Eyes Conjunctiva - Right: Normal, Left: Normal. Pupil - Right: Normal, Left: Normal.    Ears Inspection - Right: Normal, Left: Normal.   Ears = bilateral cerumen occlussions.    Fully cleaned under microscope using instrumentation and suctioning.    Normal tympanic membranes.     Nasal External nose - Normal.   Nasal septum - Normal.  Turbinates - Normal.   Oral/Oropharynx Lips - Normal, Tonsils - Normal, Tongue - Normal    Neck Exam Inspection - Normal. Palpation - Normal. Parotid gland - Normal. Thyroid gland - Normal.   Lymph Detail Submental. Submandibular. Anterior cervical. Posterior cervical. Supraclavicular all without enlargement   Psychiatric Orientation - Oriented to time, place, person & situation. Appropriate mood and affect.   Neurological Memory - Normal. Cranial nerves - Cranial nerves II through XII grossly intact.     ASSESSMENT AND PLAN:   1. Cerumen debris on tympanic membrane of both ears  Fully cleaned.  Follow-up in 6 months time, sooner if problems.    2. Decreased hearing of both ears  Can get an audiogram if hearing loss progresses.      The patient indicates understanding of these issues and agrees to the  plan.      Sylvia Escobar MD  12/3/2024  4:46 PM

## 2024-12-03 NOTE — PATIENT INSTRUCTIONS
Your ears were fully cleaned of cerumen.  The remainder of your head neck exam was within normal limits.  Follow-up in 6 months time, sooner if problems.

## (undated) NOTE — LETTER
[facility]   Date:   10/23/2023     Name:   Elisabet Dougherty    YOB: 1950   MRN:   [id number]       WHERE IS YOUR PAIN NOW? Sharon the areas on your body where you feel the described sensations. Use the appropriate symbol. Claribel Kristine the areas of radiation. Include all affected areas. Just to complete the picture, please draw in the face. ACHE:  ^ ^ ^   NUMBNESS:  0000   PINS & NEEDLES:  = = = =                              ^ ^ ^                       0000              = = = =                                    ^ ^ ^                       0000            = = = =      BURNING:  XXXX   STABBING: ////                  XXXX                ////                         XXXX          ////     Please sharon the line below indicating your degree of pain right now  with 0 being no pain 10 being the worst pain possible.                                          0             1             2              3             4              5              6              7             8             9             10         Patient Signature: